# Patient Record
Sex: MALE | Race: WHITE | HISPANIC OR LATINO | URBAN - METROPOLITAN AREA
[De-identification: names, ages, dates, MRNs, and addresses within clinical notes are randomized per-mention and may not be internally consistent; named-entity substitution may affect disease eponyms.]

---
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
## 2020-06-04 NOTE — ED ADULT NURSE NOTE - NS_NURSE_DISC_TEACHING_YN_ED_ALL_ED
Follow your After Visit Summary. Be consistent with your diet and vitamin K foods. Contact office with any medication changes including supplements or over the counter medicines. Monitor for any signs of bleeding or unusual bruising- call office or seek medical attention if they occur. Monitor for any signs of a clot such as sudden shortness of breath, chest pain, or redness, warmth, swelling of arms or legs- seek medical attention if they occur. Call office with any questions.        Yes

## 2023-04-20 NOTE — ED ADULT NURSE NOTE - OBJECTIVE STATEMENT
Health Maintenance Due   Topic Date Due   • Hepatitis B Vaccine (1 of 3 - 3-dose series) Never done   • Hepatitis C Screening  Never done       Patient is due for topics as listed above but is not proceeding with Immunization(s) Hep B and Hepatitis C Screening at this time.    pt presents to ed with c/o rash & itchiness on b/l arms, back & abdomen. pt unaware of possible cause of reaction. rash began on arm at 1800 3/14/19.